# Patient Record
(demographics unavailable — no encounter records)

---

## 2020-01-01 NOTE — NUR
Dismissal instructions reviewed with parents. State understanding. ID bands matched. Numbers 
verified. Mother signed form. Formula given. Hearing screen explained. Immunization record 
and complimentary hospital birth certificate given. Follow up appointment made with Dr. Tobar at [facility] for Monday, May 11th at 10 am. Parents asked appropriate 
questions.

## 2020-01-01 NOTE — NUR
VS checked. Infant showing hunger cues. Assisted father to feed infant. Father got infant to 
take 5cc. Additional 15cc given per RN. Infant burped well. No emesis.

## 2020-01-01 NOTE — NUR
Infant to [facility] per crib for shift assessment. Hearing screen done, passed bilaterally. Cord 
stump dry, clamp removed. VS checked. Infant voiding and stooling adequately. Taking similac 
formula per bottle well, no emesis. Raised white area on left wrist now without white color, 
but remains raised.  Infant swaddled and back to parents for continued care. Encouraged to 
choose follow up physician so discharge can continue.

## 2020-01-01 NOTE — NUR
Infant to nsy per crib for initial bath. To radiant warmer. Bath given with baby bath. 
Diapered and dressed. Stockinette hat on. Infant voided during bath. SpO2 check done during 
bath, 100%  Infant swaddled and out to mother for continued care. Parents asking if infant 
can stay in nsy overnite, state they need to rest. Will pass on to next shift.

## 2020-01-01 NOTE — DISCHARGE INST-NURSERY
Discharge Inst-Nursery


Reconcile Patient Problems


Problems Reviewed?:  Yes





Instructions/Follow Up


Patient Instructions/Follow Up:  


with [facility] pediatrician in 1 week.





Activity


Avoid ALL Tobacco Products:  Second Hand Smoke





Diet


Pediatric Feeding Method:  Bottle


Pediatric Feeding Formula Type:  Similac





Symptoms Report to Physician


[facility] to [facility]:  


poor feeding or poor urine output.  Fever greater than 100.5


Parent Questions Call:  Nurse @ [phone], Call your physician


For Problems/Questions:  Contact Your Physician











REJI HATFIELD MD               May 8, 2020 10:46

## 2020-01-01 NOTE — NEWBORN INFANT-DISCHARGE
[place] Infant Discharge


Subjective/Events-Last Exam


Feeding well according to mother.  She has no concerns.  White patch on the L 

wrist has improved "dried up":


Date Patient Was Seen:  May 8, 2020


Time Patient Was Seen:  07:20





Condition/Feeding


 Feeding Method:  Bottle-Formula





Discharge Examination


Level of Alertness:  Alert


Activity/State:  Active Alert


Skin Comments:  


see nurses notes, white area on left wrist


Head Circumference:  12.75


Fontanelles:  Soft


Anterior [place] Descriptio:  WNL


Cephalohematoma:  No


Sclera Description:  Clear


Ears:  Normal


Mouth, Nose, Eyes:  Hard & Soft Palate Intact


Neck:  Head Mobile, Clavicles Intact


Chest Circumference:  12.37


Cardiovascular:  Regular Rhythm


Respiratory:  Regular


Breath Sounds:  Clear


Caput Succedaneum:  No


Abdomen:  Soft


Abdomen Circumference:  11.75


Genitalia:  Appear Normal


Back:  Spine Closed, Anus Patent


Movement:  Symmetric-Body, Full ROM


Extremities:  5 digits present on each extremity





Weight/Height


Height (Inches):  19.25


Height (Calculated Centimeters:  48.599953


Weight (Pounds):  5


Weight (Ounces):  10.5


Weight (Calculated Kilograms):  2.556054


Weight (Calculated Grams):  2565.632





Vital Signs/Labs/SS


Vital Signs





Vital Signs








  Date Time  Temp Pulse Resp B/P (MAP) Pulse Ox O2 Delivery O2 Flow Rate FiO2


 


20 09:10 36.9 149 60     


 


20 20:30 36.8 136 50     


 


20 18:20 36.7 124 70  100   


 


20 18:00 37.2 138 64  100   


 


20 15:45 36.9 142 52     


 


20 13:30 37.0 148 50     


 


20 12:07 36.6 136 54     


 


20 11:34 36.6 156 56     











Hearing Screening


Date of Hearing Screening:  May 8, 2020


Results of Hearing Screening:  Pass





Discharge Diagnosis/Plan


Hep B Vaccine Given?:  Yes


Discharge Diagnosis/Impression:  Birth (), Infant (female), Living, Term 

(38w)


Plan


1.  DC to home today


-will fu with [facility] peds in 1 week.


-infant will continue with formula Similac for feedings.











REJI HATFIELD MD               May 8, 2020 10:44

## 2020-01-01 NOTE — NEWBORN INFANT H&P-ADMISSION
[place] Infant Record


Exam Date & Time


Date seen by provider:  May 7, 2020


Time seen by provider:  17:10


No problems following delivery.  Infant feeding by formula





Provider


PCP


No local physician





Delivery Assessment


Expected Date of Delivery:  May 23, 2020


Hx :  1


Hx Para:  1


Gestational Age in Weeks:  38


Delivery Date:  May 7, 2020


Condition of Infant:  Living


Infant Delivery Method:  Spontaneous Vaginal


Operative Indications (Cesarea:  N/A-Vaginal Delivery


Prenatal Events:  Routine Prenatal care (with Dr Vaca)


Intrapartal Events:  None


Gender:  Female


Viability:  Living





Mother's Group Strep


Mother's Group B Strep:  Positive


# of Doses for Mother:  1





Apgar Score


Apgar Score at 5 Minutes:  8


Apgar Score at 10 Minutes:  9





Condition/Feeding


Benefits of breastfeeding discussed with mother.


[place] Feeding Method:  Bottle-Formula


Gestation:  Single





Admission Examination


Level of Alertness:  Alert


Activity/State:  Active Alert


Skin:  Vernix


Fontanelles:  Soft


Anterior [place] Descriptio:  WNL


Cephalohematoma:  No


Sclera Description:  Clear


Ears:  Normal


Mouth, Nose, Eyes:  Hard & Soft Palate Intact


Neck:  Head Mobile, Clavicles Intact


Cardiovascular:  Regular Rhythm


Respiratory:  Regular


Breath Sounds:  Clear


Caput Succedaneum:  No


Abdomen:  Soft


Genitalia:  Appear Normal


Back:  Spine Closed, Anus Patent


Movement:  Symmetric-Body, Full ROM


Extremities:  5 digits present on each extremity


White patch slightly raised on L wrist laterally.





Weight/Height


Weight (Pounds):  5


Weight (Ounces):  15





Impression on Admission


Impression on Admission:  Birth (), Infant (female), Living, Term (38w)





Progress/Plan/Problem List


Progress/Plan


1.  Term  female


-admit to level 1 nursery


-infant to formula feed per mothers request


-White patch to L wrist.  Appears benign and will recheck in am.











REJI HATFIELD MD               May 7, 2020 14:51

## 2020-01-01 NOTE — NUR
1118 Vaginal delivery of viable baby girl per Dr. Vaca. Meconium noted to fluid.  Suctioned 
with bulb syringe after head delivered, then infant to abdomen. Dried and stimulated. 

1119 Infant crying, MAEW, cyanotic, HR above 100  Cord clamped by physician, cut by father. 
Infant vigorous, will allow to remain with mother for initial steps. 

1122 ID bands #[id number] placed x1 infant ankle, x1 infant wrist, x1 moms wrist, x1 dads wrist  
Infant voided

1125 Vitamin K 1mg IM RAT   Hugs tag applied

1129 Infant to preheated radiant warmer for weight   5 pounds 15 ounces   2680 grams    19 
1/4 inches

1130 Erythromycin ointment OU  Measurements done

1134 VS checked

1135 OG suctioned r/t meconium in fluid at delivery, 7cc clear, light yellow fluid returned. 


1138 Infant swaddled and to father for bonding. Carried to mother for viewing. Discussed 
feeding infant in first hour, delayed bathing, and crib supplies. Feeding/diaper record 
reviewed.

## 2020-01-01 NOTE — NUR
Initial and gestational age assessment done in mothers room.  VS checked. Infant noted to 
have raised white birthmark ? on outer  left hand, near wrist. Will notify physician. Infant 
has voided and stooled. Diaper changed with parents observing. Infant remains in room with 
parents.

## 2020-01-01 NOTE — NUR
Infant to nursery miscommunication with parents and infant did not feed at 0100. Infant 
eating at this time after daily wt and Hep B Vaccine.

## 2020-01-01 NOTE — NUR
Parent s request for infant to remain in nursery tonight so they can sleep. Parents educated 
on reason for room in status but assured that if infant is fussy and they are unable to rest 
this RN will watch infant if not previously involved in delivery.